# Patient Record
Sex: MALE
[De-identification: names, ages, dates, MRNs, and addresses within clinical notes are randomized per-mention and may not be internally consistent; named-entity substitution may affect disease eponyms.]

---

## 2017-01-08 ENCOUNTER — HOSPITAL ENCOUNTER (EMERGENCY)
Dept: HOSPITAL 53 - M ED | Age: 32
Discharge: HOME | End: 2017-01-08
Payer: OTHER GOVERNMENT

## 2017-01-08 DIAGNOSIS — R07.89: Primary | ICD-10-CM
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
## 2017-01-08 DIAGNOSIS — Z88.1: ICD-10-CM
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
## 2017-01-08 DIAGNOSIS — S29.012A: ICD-10-CM
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
## 2017-01-08 NOTE — EDDOCDS
Physician Documentation                                                                           

[facility]                                                                         

Name: Wilton Ayoub                                                                                

Age: 31 yrs                                                                                       

Sex: Male                                                                                         

: 1985                                                                                   

MRN: [id number]                                                                                     

Arrival Date: 2017                                                                          

Time: 13:13                                                                                       

Account#: [id number]                                                                              

Bed PD                                                                                      

Private MD: ZOILA Nieves                                                                          

Disposition:                                                                                      

17 14:52 Discharged to Home/Self Care. Impression: Other chest pain - xyphoid, Strain of    

muscle and tendon of back wall of thorax.                                                       

- Condition is Stable.                                                                            

- Discharge Instructions: Thoracic Strain, Easy-to-Read.                                          

- Prescriptions for Diclofenac Sodium 75 mg Oral Tablet, Delayed Release (E.C.) - take            

1 tablet by ORAL route 2 times per day; 30 tablet.                                              

- Medication Reconciliation, Local Pharmacy Hours form.                                           

- Follow up: ZOILA Nieves; When: 1 - 2 days; Reason: Further diagnostic work-up,                  

Recheck today's complaints, Continuance of care.                                                

- Problem is new.                                                                                 

- Symptoms have improved.                                                                         

                                                                                                  

                                                                                                  

                                                                                                  

Historical:                                                                                       

- Allergies: Zithromax (Rash, Vomit);                                                             

- Home Meds:                                                                                      

1. none                                                                                         

- PMHx: none;                                                                                     

- PSHx: Hernia repair- Umbilical;                                                                 

- Social history: Smoking status: Patient states was never smoker of tobacco. No                  

barriers to communication noted, Speaks appropriately for age.                                  

- Family history: Not pertinent.                                                                  

- : The pt / caregiver states he / she is not on anticoagulants. Home medication list             

is obtained from the patient.                                                                   

- Exposure Risk Screening:: None identified.                                                      

                                                                                                  

                                                                                                  

Vital Signs:                                                                                      

                                                                                             

13:14  / 91; Pulse 72; Resp 18; Pulse Ox 100% on R/A; Weight 72.57 kg / 159.99 lbs (R); elp 

      Height 5 ft. 5 in. (165.10 cm) (R); Pain 1/10;                                              

14:53  / 91; Pulse 94; Resp 18; Temp 98.0; Pulse Ox 99% ; Pain 1/10;                    jam1

13:14 Body Mass Index 26.63 (72.57 kg, 165.10 cm)                                             elp 

                                                                                                  

MDM:                                                                                              

14:23 Chest, 2 View (pa\E\lat) Ordered.                                                         EDMS

14:25 Financial registration complete.                                                        lg  

                                                                                                  

Signatures:                                                                                       

Dispatcher [employer]                           EDMS                                                 

Glenn Burton, Reg                    Reg  lg                                                   

Ezequiel Jasmine, RN                       RN   ml6                                                  

Moy Fields PA PA btw                                                  

Lynnette Barton,RN                    RN   js13                                                 

                                                                                                  

**************************************************************************************************

MTDD

## 2017-01-08 NOTE — EDDOCDS
Nurse's Notes                                                                                     

[facility]                                                                         

Name: Wilton Ayoub                                                                                

Age: 31 yrs                                                                                       

Sex: Male                                                                                         

: 1985                                                                                   

MRN: [id number]                                                                                     

Arrival Date: 2017                                                                          

Time: 13:13                                                                                       

Account#: [id number]                                                                              

Bed PD                                                                                      

Private MD: ZOILA Nieves                                                                          

Diagnosis: Other chest pain-xyphoid;Strain of muscle and tendon of back wall of thorax            

                                                                                                  

Presentation:                                                                                     

                                                                                             

13:18 Presenting complaint: Patient states: states epigastric pain radiating to back x 2      ml6 

      days. Risk factors: the patient reports not having a history of previous torsion. Adult     

      Sepsis Screening: The patient does not have new or worsening altered mentation.             

      Patient's respiratory rate is less than 22. Systolic blood pressure is greater than         

      100. Patient has a qSOFA score of 0- Negative Sepsis Screen. Suicide/Homicide risk          

      assessment- the patient denies having any suicidal and/or homicidal ideations and does      

      not present with any other emotional, behavioral or mental health complaints.       

      Status: The patient is an active duty . Transition of care: patient was       

      not received from another setting of care.                                                  

13:18 Acuity: DONTE Level 3                                                                     ml6 

13:18 Method Of Arrival: Walkin/Carried/Asstd                                                 ml6 

                                                                                                  

Triage Assessment:                                                                                

13:19 General: Appears in no apparent distress, Behavior is appropriate for age, cooperative. ml6 

      Pain: Location: epigastric area Pain currently is 1 out of 10 on a pain scale. Pain         

      does not radiate. Quality of pain is described as aching, Pain began 2-3 days ago Is        

      continuous. HIV screening NA for this visit Offered previously. Neurological: No            

      deficits noted. Cardiovascular: No deficits noted. Capillary refill < 3 seconds is          

      brisk in bilateral fingers toes. GI: Abdomen is flat, non- distended Bowel sounds           

      present X 4 quads. Abd is soft and non tender X 4 quads. Denies diarrhea, nausea,           

      vomiting, pain.                                                                             

                                                                                                  

Historical:                                                                                       

- Allergies: Zithromax (Rash, Vomit);                                                             

- Home Meds:                                                                                      

1. none                                                                                         

- PMHx: none;                                                                                     

- PSHx: Hernia repair- Umbilical;                                                                 

- Social history: Smoking status: Patient states was never smoker of tobacco. No                  

barriers to communication noted, Speaks appropriately for age.                                  

- Family history: Not pertinent.                                                                  

- : The pt / caregiver states he / she is not on anticoagulants. Home medication list             

is obtained from the patient.                                                                   

- Exposure Risk Screening:: None identified.                                                      

                                                                                                  

                                                                                                  

Screenin:56 Screening information is obtained from the patient. Fall risk: No risks identified.     js13

      Assistance ADL's: requires no assistance with activities of daily living. Abuse/DV          

      Screen: The patient / caregiver reports he/she is: not in a situation that causes fear,     

      pain or injury. Nutritional screening: No deficits noted. Advance Directives: There is      

      no active DNR order. home support is adequate.                                              

                                                                                                  

Assessment:                                                                                       

14:53 General: Appears in no apparent distress, comfortable. Pain: Denies pain. Neurological: js13

      Level of Consciousness is awake, alert. Respiratory: No deficits noted. Airway is           

      patent Respiratory effort is even, unlabored, Respiratory pattern is regular,               

      symmetrical. GI: Abdomen is non- distended. Derm: Skin is pink, warm & dry.               

                                                                                                  

Vital Signs:                                                                                      

13:14  / 91; Pulse 72; Resp 18; Pulse Ox 100% on R/A; Weight 72.57 kg (R); Height 5 ft. elp 

      5 in. (165.10 cm) (R); Pain 1/10;                                                           

14:53  / 91; Pulse 94; Resp 18; Temp 98.0; Pulse Ox 99% ; Pain 1/10;                    jam1

13:14 Body Mass Index 26.63 (72.57 kg, 165.10 cm)                                             elp 

                                                                                                  

Vitals:                                                                                           

13:14 Log In Time: 2017 at 13:12.                                                 elp 

                                                                                                  

ED Course:                                                                                        

13:14 Patient visited by Tonya Lane PCA.                                                  elp 

13:14 LUZMA Nieves is Private Physician.                                                      elp 

13:14 Patient moved to Waiting                                                                elp 

13:15 Patient visited by Tonya Lane PCA.                                                  elp 

13:16 Patient moved to Pre RCE                                                                elp 

13:18 Triage Initiated                                                                        ml6 

13:51 Patient moved to Triage 2                                                               ml6 

13:56 The patient / caregiver is instructed regarding the plan of care and ED course.         js13

14:12 Moy Fields PA is PHCP.                                                         btw 

14:12 Lundborg-Gray, Maja, MD is Attending Physician.                                         btw 

14:12 Patient visited by Moy Fields PA.                                              btw 

14:23 Patient moved to TR4                                                                    js13

14:51 ZOILA Nieves is Referral Physician.                                                     btw 

14:51 Patient moved to PD2 / 27                                                               ml6 

14:53 No IV's were initiated during this patient's visit. No procedures done that require     js13

      assistance.                                                                                 

                                                                                                  

Order Results:                                                                                    

There are currently no results for this order.                                                    

Outcome:                                                                                          

14:52 Discharge ordered by Provider.                                                          btw 

14:54 Discharge Assessment: Patient awake, alert and oriented x 3. No cognitive and/or        js13

      functional deficits noted. Patient verbalized understanding of disposition                  

      instructions. patient administered narcotics - no. The following High Risk Discharge        

      criteria are identified: None. Discharged to home ambulatory. Condition: stable.            

      Discharge instructions given to patient, Instructed on discharge instructions, follow       

      up and referral plans. medication usage, Demonstrated understanding of instructions,        

      medications, Pt was receptive of discharge instructions/ teaching. Prescriptions given      

      X 1. No special radiology studies were completed. Property :Personal belongings             

      accompany Pt.                                                                               

14:56 Patient left the ED.                                                                    js13

                                                                                                  

Signatures:                                                                                       

Betzy Fuentes, PCA                       PCA  jam1                                                 

Ezequiel Jasmine, RN                       RN   ml6                                                  

Moy Fields PA PA   btw                                                  

Lynnette BartonRN                    RN   js13                                                 

Tonya Lane, PCA                      PCA  elp                                                  

                                                                                                  

**************************************************************************************************

[facility]

## 2017-01-08 NOTE — REP
Chest x-ray:  Two views.

 

History: Xiphoid area pain.  .

 

Comparison study: No comparison study .

 

Findings:  The lungs are well inflated and free of infiltrate.  The pleural

angles are sharp.  The heart size is normal.  Pulmonary vasculature is not

increased.  No significant bony abnormality is seen. No sternal or xyphoid

abnormality.

 

Impression:

 

Negative chest x-ray.

 

 

Signed by

Christiano Montana MD 01/08/2017 02:31 P

## 2017-01-10 NOTE — EDDOCDS
Physician Documentation                                                                           

[facility]                                                                         

Name: Wilton Ayoub                                                                                

Age: 31 yrs                                                                                       

Sex: Male                                                                                         

: 1985                                                                                   

MRN: [id number]                                                                                     

Arrival Date: 2017                                                                          

Time: 13:13                                                                                       

Account#: [id number]                                                                              

Bed PD                                                                                      

Private MD: ZOILA Nieves                                                                          

Disposition:                                                                                      

17 14:52 Discharged to Home/Self Care. Impression: Other chest pain - xyphoid, Strain of    

muscle and tendon of back wall of thorax.                                                       

- Condition is Stable.                                                                            

- Discharge Instructions: Thoracic Strain, Easy-to-Read.                                          

- Prescriptions for Diclofenac Sodium 75 mg Oral Tablet, Delayed Release (E.C.) - take            

1 tablet by ORAL route 2 times per day; 30 tablet.                                              

- Medication Reconciliation, Local Pharmacy Hours form.                                           

- Follow up: ZOILA Nieves; When: 1 - 2 days; Reason: Further diagnostic work-up,                  

Recheck today's complaints, Continuance of care.                                                

- Problem is new.                                                                                 

- Symptoms have improved.                                                                         

                                                                                                  

                                                                                                  

                                                                                                  

Historical:                                                                                       

- Allergies: Zithromax (Rash, Vomit);                                                             

- Home Meds:                                                                                      

1. none                                                                                         

- PMHx: none;                                                                                     

- PSHx: Hernia repair- Umbilical;                                                                 

- Social history: Smoking status: Patient states was never smoker of tobacco. No                  

barriers to communication noted, Speaks appropriately for age.                                  

- Family history: Not pertinent.                                                                  

- : The pt / caregiver states he / she is not on anticoagulants. Home medication list             

is obtained from the patient.                                                                   

- Exposure Risk Screening:: None identified.                                                      

                                                                                                  

                                                                                                  

Vital Signs:                                                                                      

                                                                                             

13:14  / 91; Pulse 72; Resp 18; Pulse Ox 100% on R/A; Weight 72.57 kg / 159.99 lbs (R); elp 

      Height 5 ft. 5 in. (165.10 cm) (R); Pain 1/10;                                              

14:53  / 91; Pulse 94; Resp 18; Temp 98.0; Pulse Ox 99% ; Pain 1/10;                    jam1

13:14 Body Mass Index 26.63 (72.57 kg, 165.10 cm)                                             elp 

                                                                                                  

MDM:                                                                                              

14:23 Chest, 2 View (pa\E\lat) Ordered.                                                         EDMS

14:25 Financial registration complete.                                                        lg  

15:02 [facility] Payment Agreement was scanned into [employer] and attached to record.               lg  

21:22 T-Sheet-- Draft Copy was scanned into [employer] and attached to record.                   klr 

                                                                                                  

Signatures:                                                                                       

Dispatcher [employer]                           EDGlenn Jimenez, Reg                    Reg  lg                                                   

Ezequiel Jasmine RN                       RN   ml6                                                  

Moy Fields PA PA btw Sullivan, JenniferRN                    RN   js13                                                 

Lisa Adams                               klalfonso                                                  

                                                                                                  

The chart was reviewed and I authenticate all verbal orders and agree with the evaluation and 
treatment provided.Attachments:

15:02 [facility] Payment Agreement                                                                lg  

21:22 T-Sheet-- Draft Copy                                                                    klr 

                                                                                                  

**************************************************************************************************



*** Chart Complete ***
MTDD

## 2017-01-10 NOTE — EDDOCDS
Nurse's Notes                                                                                     

[facility]                                                                         

Name: Wilton Ayoub                                                                                

Age: 31 yrs                                                                                       

Sex: Male                                                                                         

: 1985                                                                                   

MRN: [id number]                                                                                     

Arrival Date: 2017                                                                          

Time: 13:13                                                                                       

Account#: [id number]                                                                              

Bed PD                                                                                      

Private MD: ZOILA Nieves                                                                          

Diagnosis: Other chest pain-xyphoid;Strain of muscle and tendon of back wall of thorax            

                                                                                                  

Presentation:                                                                                     

                                                                                             

13:18 Presenting complaint: Patient states: states epigastric pain radiating to back x 2      ml6 

      days. Risk factors: the patient reports not having a history of previous torsion. Adult     

      Sepsis Screening: The patient does not have new or worsening altered mentation.             

      Patient's respiratory rate is less than 22. Systolic blood pressure is greater than         

      100. Patient has a qSOFA score of 0- Negative Sepsis Screen. Suicide/Homicide risk          

      assessment- the patient denies having any suicidal and/or homicidal ideations and does      

      not present with any other emotional, behavioral or mental health complaints.       

      Status: The patient is an active duty . Transition of care: patient was       

      not received from another setting of care.                                                  

13:18 Acuity: DONTE Level 3                                                                     ml6 

13:18 Method Of Arrival: Walkin/Carried/Asstd                                                 ml6 

                                                                                                  

Triage Assessment:                                                                                

13:19 General: Appears in no apparent distress, Behavior is appropriate for age, cooperative. ml6 

      Pain: Location: epigastric area Pain currently is 1 out of 10 on a pain scale. Pain         

      does not radiate. Quality of pain is described as aching, Pain began 2-3 days ago Is        

      continuous. HIV screening NA for this visit Offered previously. Neurological: No            

      deficits noted. Cardiovascular: No deficits noted. Capillary refill < 3 seconds is          

      brisk in bilateral fingers toes. GI: Abdomen is flat, non- distended Bowel sounds           

      present X 4 quads. Abd is soft and non tender X 4 quads. Denies diarrhea, nausea,           

      vomiting, pain.                                                                             

                                                                                                  

Historical:                                                                                       

- Allergies: Zithromax (Rash, Vomit);                                                             

- Home Meds:                                                                                      

1. none                                                                                         

- PMHx: none;                                                                                     

- PSHx: Hernia repair- Umbilical;                                                                 

- Social history: Smoking status: Patient states was never smoker of tobacco. No                  

barriers to communication noted, Speaks appropriately for age.                                  

- Family history: Not pertinent.                                                                  

- : The pt / caregiver states he / she is not on anticoagulants. Home medication list             

is obtained from the patient.                                                                   

- Exposure Risk Screening:: None identified.                                                      

                                                                                                  

                                                                                                  

Screenin:56 Screening information is obtained from the patient. Fall risk: No risks identified.     js13

      Assistance ADL's: requires no assistance with activities of daily living. Abuse/DV          

      Screen: The patient / caregiver reports he/she is: not in a situation that causes fear,     

      pain or injury. Nutritional screening: No deficits noted. Advance Directives: There is      

      no active DNR order. home support is adequate.                                              

                                                                                                  

Assessment:                                                                                       

14:53 General: Appears in no apparent distress, comfortable. Pain: Denies pain. Neurological: js13

      Level of Consciousness is awake, alert. Respiratory: No deficits noted. Airway is           

      patent Respiratory effort is even, unlabored, Respiratory pattern is regular,               

      symmetrical. GI: Abdomen is non- distended. Derm: Skin is pink, warm & dry.               

                                                                                                  

Vital Signs:                                                                                      

13:14  / 91; Pulse 72; Resp 18; Pulse Ox 100% on R/A; Weight 72.57 kg (R); Height 5 ft. elp 

      5 in. (165.10 cm) (R); Pain 1/10;                                                           

14:53  / 91; Pulse 94; Resp 18; Temp 98.0; Pulse Ox 99% ; Pain 1/10;                    jam1

13:14 Body Mass Index 26.63 (72.57 kg, 165.10 cm)                                             elp 

                                                                                                  

Vitals:                                                                                           

13:14 Log In Time: 2017 at 13:12.                                                 elp 

                                                                                                  

ED Course:                                                                                        

13:14 Patient visited by Tonya Lane PCA.                                                  elp 

13:14 LUZMA Nieves is Private Physician.                                                      elp 

13:14 Patient moved to Waiting                                                                elp 

13:15 Patient visited by Tonya Lane PCA.                                                  elp 

13:16 Patient moved to Pre RCE                                                                elp 

13:18 Triage Initiated                                                                        ml6 

13:51 Patient moved to Triage 2                                                               ml6 

13:56 The patient / caregiver is instructed regarding the plan of care and ED course.         js13

14:12 Moy Fields PA is PHCP.                                                         btw 

14:12 Lundborg-Gray, Maja, MD is Attending Physician.                                         btw 

14:12 Patient visited by Moy Fields PA.                                              btw 

14:23 Patient moved to TR4                                                                    js13

14:51 ZOILA Nieves is Referral Physician.                                                     btw 

14:51 Patient moved to PD                                                               ml6 

14:53 No IV's were initiated during this patient's visit. No procedures done that require     js13

      assistance.                                                                                 

15:00 Patient name changed from Wilton\S\F\S\Mccroy\S\ to Wilton\S\Paul\S\Mccroy.                 
   EDMS

15:02 [facility] Payment Agreement was scanned into [employer] and attached to record.               lg  

15:12 Chest, 2 View (pa\E\lat) Returned.                                                        EDMS

21:22 T-Sheet-- Draft Copy was scanned into [employer] and attached to record.                   klr 

                                                                                                  

Order Results:                                                                                    

                                                                                                  

Radiology Order: Chest, 2 View (pa\E\lat)                                                         

      Test: Chest, 2 View (pa\E\lat)                                                              

      REASON FOR EXAMINATION: xyphoid pain; Chest x-ray: Two views.; ; History: Xiphoid area      

      pain. .; ; Comparison study: No comparison study .; ; Findings: The lungs are well          

      inflated and free of infiltrate. The pleural; angles are sharp. The heart size is           

      normal. Pulmonary vasculature is not; increased. No significant bony abnormality is         

      seen. No sternal or xyphoid; abnormality.; ; Impression:; ; Negative chest x-ray.; ; ;      

      Signed by; Christiano Montana MD 2017 02:31 P;                                              

Outcome:                                                                                          

14:52 Discharge ordered by Provider.                                                          btw 

14:54 Discharge Assessment: Patient awake, alert and oriented x 3. No cognitive and/or        js13

      functional deficits noted. Patient verbalized understanding of disposition                  

      instructions. patient administered narcotics - no. The following High Risk Discharge        

      criteria are identified: None. Discharged to home ambulatory. Condition: stable.            

      Discharge instructions given to patient, Instructed on discharge instructions, follow       

      up and referral plans. medication usage, Demonstrated understanding of instructions,        

      medications, Pt was receptive of discharge instructions/ teaching. Prescriptions given      

      X 1. No special radiology studies were completed. Property :Personal belongings             

      accompany Pt.                                                                               

14:56 Patient left the ED.                                                                    js13

                                                                                                  

Signatures:                                                                                       

Dispatcher [employer]                           EDMS                                                 

Betzy Fuentes, PCA                       PCA  jam1                                                 

Glenn Burton, Reg                    Reg  lg                                                   

Ezequiel Jasmine RN                       RN   ml6                                                  

Moy Fields PA                  PA   btw                                                  

Lynnette BartonRN                    RN   js13                                                 

Tonya Lane, PCA                      PCA  Lisa Sloan                                                  

                                                                                                  

**************************************************************************************************



*** Chart Complete ***
MTDD

## 2017-01-10 NOTE — EDDOCDS
Physician Documentation                                                                           

[facility]                                                                         

Name: Wilton Ayoub                                                                                

Age: 31 yrs                                                                                       

Sex: Male                                                                                         

: 1985                                                                                   

MRN: [id number]                                                                                     

Arrival Date: 2017                                                                          

Time: 13:13                                                                                       

Account#: [id number]                                                                              

Bed PD                                                                                      

Private MD: ZOILA Nieves                                                                          

Disposition:                                                                                      

17 14:52 Discharged to Home/Self Care. Impression: Other chest pain - xyphoid, Strain of    

muscle and tendon of back wall of thorax.                                                       

- Condition is Stable.                                                                            

- Discharge Instructions: Thoracic Strain, Easy-to-Read.                                          

- Prescriptions for Diclofenac Sodium 75 mg Oral Tablet, Delayed Release (E.C.) - take            

1 tablet by ORAL route 2 times per day; 30 tablet.                                              

- Medication Reconciliation, Local Pharmacy Hours form.                                           

- Follow up: ZOILA Nieves; When: 1 - 2 days; Reason: Further diagnostic work-up,                  

Recheck today's complaints, Continuance of care.                                                

- Problem is new.                                                                                 

- Symptoms have improved.                                                                         

                                                                                                  

                                                                                                  

                                                                                                  

Historical:                                                                                       

- Allergies: Zithromax (Rash, Vomit);                                                             

- Home Meds:                                                                                      

1. none                                                                                         

- PMHx: none;                                                                                     

- PSHx: Hernia repair- Umbilical;                                                                 

- Social history: Smoking status: Patient states was never smoker of tobacco. No                  

barriers to communication noted, Speaks appropriately for age.                                  

- Family history: Not pertinent.                                                                  

- : The pt / caregiver states he / she is not on anticoagulants. Home medication list             

is obtained from the patient.                                                                   

- Exposure Risk Screening:: None identified.                                                      

                                                                                                  

                                                                                                  

Vital Signs:                                                                                      

                                                                                             

13:14  / 91; Pulse 72; Resp 18; Pulse Ox 100% on R/A; Weight 72.57 kg / 159.99 lbs (R); elp 

      Height 5 ft. 5 in. (165.10 cm) (R); Pain 1/10;                                              

14:53  / 91; Pulse 94; Resp 18; Temp 98.0; Pulse Ox 99% ; Pain 1/10;                    jam1

13:14 Body Mass Index 26.63 (72.57 kg, 165.10 cm)                                             elp 

                                                                                                  

MDM:                                                                                              

14:23 Chest, 2 View (pa\E\lat) Ordered.                                                         EDMS

14:25 Financial registration complete.                                                        lg  

15:02 [facility] Payment Agreement was scanned into [employer] and attached to record.               lg  

21:22 T-Sheet-- Draft Copy was scanned into [employer] and attached to record.                   klr 

                                                                                                  

Signatures:                                                                                       

Dispatcher [employer]                           EDGlenn Jimenez, Reg                    Reg  lg                                                   

Ezequiel Jasmine RN                       RN   [id number]                                                  

Moy Fields PA PA btw Sullivan, JenniferRN                    RN   [id number]                                                 

Lisa Adams                               [email or web address]                                                  

                                                                                                  

The chart was reviewed and I authenticate all verbal orders and agree with the evaluation and 
treatment provided.Attachments:

15:02 [facility] Payment Agreement                                                                lg  

21:22 T-Sheet-- Draft Copy                                                                    klr 

                                                                                                  

**************************************************************************************************



*** Chart Complete ***
MTDD

## 2018-04-27 ENCOUNTER — HOSPITAL ENCOUNTER (OUTPATIENT)
Dept: HOSPITAL 53 - M OPP | Age: 33
Discharge: HOME | End: 2018-04-27
Attending: INTERNAL MEDICINE
Payer: COMMERCIAL

## 2018-04-27 DIAGNOSIS — R63.4: ICD-10-CM

## 2018-04-27 DIAGNOSIS — R10.13: Primary | ICD-10-CM

## 2018-04-27 DIAGNOSIS — K31.89: ICD-10-CM

## 2018-04-27 DIAGNOSIS — F90.9: ICD-10-CM

## 2018-04-27 DIAGNOSIS — K26.9: ICD-10-CM

## 2018-04-27 DIAGNOSIS — Z88.1: ICD-10-CM

## 2018-04-27 PROCEDURE — 43239 EGD BIOPSY SINGLE/MULTIPLE: CPT

## 2018-04-27 RX ADMIN — SODIUM CHLORIDE 1 MLS/HR: 9 INJECTION, SOLUTION INTRAVENOUS at 12:01

## 2019-09-06 ENCOUNTER — HOSPITAL ENCOUNTER (EMERGENCY)
Dept: HOSPITAL 53 - M ED | Age: 34
Discharge: HOME | End: 2019-09-06
Payer: COMMERCIAL

## 2019-09-06 VITALS — DIASTOLIC BLOOD PRESSURE: 85 MMHG | SYSTOLIC BLOOD PRESSURE: 136 MMHG

## 2019-09-06 VITALS — BODY MASS INDEX: 25.38 KG/M2 | HEIGHT: 65 IN | WEIGHT: 152.32 LBS
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
## 2019-09-06 DIAGNOSIS — S91.311A: Primary | ICD-10-CM
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
## 2019-09-06 DIAGNOSIS — S90.31XA: ICD-10-CM

## 2019-09-06 DIAGNOSIS — Z88.1: ICD-10-CM
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
## 2019-09-06 NOTE — REP
Clinical:  Trauma. Laceration.

 

Technique:  AP, lateral, bilateral oblique views right foot .

 

Findings:  The osseous structures and joint spaces are intact and normal.  There

is no evidence for acute fracture or dislocation.  Surrounding soft tissues are

unremarkable.  No subcutaneous emphysema or radiodense foreign body.

 

Impression:

Normal right foot series .  No acute fracture or dislocation.

 

 

Electronically Signed by

Dario Barrientos MD 09/06/2019 04:22 P